# Patient Record
Sex: MALE | Race: BLACK OR AFRICAN AMERICAN | NOT HISPANIC OR LATINO | Employment: UNEMPLOYED | URBAN - NONMETROPOLITAN AREA
[De-identification: names, ages, dates, MRNs, and addresses within clinical notes are randomized per-mention and may not be internally consistent; named-entity substitution may affect disease eponyms.]

---

## 2019-04-16 ENCOUNTER — HOSPITAL ENCOUNTER (EMERGENCY)
Facility: HOSPITAL | Age: 24
Discharge: HOME | End: 2019-04-16
Attending: EMERGENCY MEDICINE | Admitting: EMERGENCY MEDICINE

## 2019-04-16 VITALS
OXYGEN SATURATION: 94 % | WEIGHT: 129 LBS | HEART RATE: 89 BPM | SYSTOLIC BLOOD PRESSURE: 125 MMHG | TEMPERATURE: 98.2 F | HEIGHT: 70 IN | DIASTOLIC BLOOD PRESSURE: 57 MMHG | RESPIRATION RATE: 16 BRPM | BODY MASS INDEX: 18.47 KG/M2

## 2019-04-16 DIAGNOSIS — R51.9 NONINTRACTABLE HEADACHE, UNSPECIFIED CHRONICITY PATTERN, UNSPECIFIED HEADACHE TYPE: Primary | ICD-10-CM

## 2019-04-16 PROCEDURE — 99283 EMERGENCY DEPT VISIT LOW MDM: CPT

## 2019-04-16 PROCEDURE — 93005 ELECTROCARDIOGRAM TRACING: CPT | Performed by: EMERGENCY MEDICINE

## 2019-04-16 PROCEDURE — 93010 ELECTROCARDIOGRAM REPORT: CPT | Performed by: INTERNAL MEDICINE

## 2019-04-16 RX ORDER — ACETAMINOPHEN 500 MG
1000 TABLET ORAL ONCE
Status: COMPLETED | OUTPATIENT
Start: 2019-04-16 | End: 2019-04-16

## 2019-04-16 RX ADMIN — ACETAMINOPHEN 1000 MG: 500 TABLET, FILM COATED ORAL at 21:56

## 2019-05-02 ENCOUNTER — HOSPITAL ENCOUNTER (EMERGENCY)
Facility: HOSPITAL | Age: 24
Discharge: HOME | End: 2019-05-03
Admitting: EMERGENCY MEDICINE

## 2019-05-02 VITALS
DIASTOLIC BLOOD PRESSURE: 71 MMHG | BODY MASS INDEX: 17.81 KG/M2 | SYSTOLIC BLOOD PRESSURE: 139 MMHG | OXYGEN SATURATION: 99 % | HEART RATE: 89 BPM | RESPIRATION RATE: 18 BRPM | WEIGHT: 127.2 LBS | HEIGHT: 71 IN | TEMPERATURE: 97.7 F

## 2019-05-02 DIAGNOSIS — S61.422A LACERATION OF LEFT HAND WITH FOREIGN BODY, INITIAL ENCOUNTER: Primary | ICD-10-CM

## 2019-05-02 PROCEDURE — 99283 EMERGENCY DEPT VISIT LOW MDM: CPT

## 2019-05-03 ENCOUNTER — APPOINTMENT (OUTPATIENT)
Dept: GENERAL RADIOLOGY | Facility: HOSPITAL | Age: 24
End: 2019-05-03

## 2019-05-03 PROCEDURE — 73120 X-RAY EXAM OF HAND: CPT

## 2019-05-03 RX ORDER — LIDOCAINE HYDROCHLORIDE 10 MG/ML
10 INJECTION, SOLUTION EPIDURAL; INFILTRATION; INTRACAUDAL; PERINEURAL ONCE
Status: COMPLETED | OUTPATIENT
Start: 2019-05-03 | End: 2019-05-03

## 2019-05-03 RX ORDER — CEPHALEXIN 500 MG/1
500 CAPSULE ORAL 4 TIMES DAILY
Qty: 40 CAPSULE | Refills: 0 | Status: SHIPPED | OUTPATIENT
Start: 2019-05-03 | End: 2019-05-13

## 2019-05-03 RX ADMIN — LIDOCAINE HYDROCHLORIDE 10 ML: 10 INJECTION, SOLUTION EPIDURAL; INFILTRATION; INTRACAUDAL; PERINEURAL at 01:16

## 2019-05-03 NOTE — ED NOTES
At this time pt refuses Tdap shot. Pt was explained the importance of receiving vaccine. Pt still refuses. Provider aware        Wyatt Goetz RN  05/03/19 0032

## 2019-05-03 NOTE — ED PROVIDER NOTES
Subjective   23-year-old -American male presenting with laceration to the left hand.  He is a right-handed gentleman.  States he was running and tripped over his feet and hit his hand on the asphalt.  He does report having small pimples in the incision.  States this occurred 3 hours prior to arrival.  He is not up-to-date with his tetanus vaccination.  Denies numbness, tingling in the fingers or pain in his wrist.        History provided by:  Patient   used: No    Laceration   Location:  Hand  Hand laceration location:  L palm  Length:  2  Depth:  Through dermis  Quality: straight    Bleeding: controlled    Time since incident:  3 hours  Laceration mechanism:  Fall  Pain details:     Quality:  Aching    Severity:  Moderate    Timing:  Constant    Progression:  Unchanged  Foreign body present: asphalt.  Relieved by:  None tried  Worsened by:  Movement  Ineffective treatments:  None tried  Tetanus status:  Out of date  Associated symptoms: no fever, no focal weakness, no numbness, no rash, no redness, no swelling and no streaking        Review of Systems   Constitutional: Negative for chills, diaphoresis, fatigue and fever.   HENT: Negative for congestion and trouble swallowing.    Respiratory: Negative for shortness of breath and wheezing.    Cardiovascular: Negative for chest pain and palpitations.   Gastrointestinal: Negative for abdominal pain, diarrhea and nausea.   Genitourinary: Negative for dysuria.   Musculoskeletal: Negative for arthralgias and myalgias.   Skin: Positive for wound. Negative for rash.   Neurological: Negative for dizziness, focal weakness and numbness.   Hematological: Negative for adenopathy. Does not bruise/bleed easily.       Past Medical History:   Diagnosis Date   • Asthma        No Known Allergies    No past surgical history on file.    No family history on file.    Social History     Socioeconomic History   • Marital status: Single     Spouse name: Not on  file   • Number of children: Not on file   • Years of education: Not on file   • Highest education level: Not on file   Tobacco Use   • Smoking status: Current Every Day Smoker     Packs/day: 0.50     Types: Cigarettes   Substance and Sexual Activity   • Alcohol use: Yes   • Drug use: Yes     Types: Marijuana       Lab Results (last 24 hours)     ** No results found for the last 24 hours. **          Objective   Physical Exam   Constitutional: He is oriented to person, place, and time. He appears well-developed and well-nourished. No distress.   HENT:   Head: Normocephalic and atraumatic.   Right Ear: External ear normal.   Left Ear: External ear normal.   Eyes: Conjunctivae and EOM are normal. Pupils are equal, round, and reactive to light. Right eye exhibits no discharge. Left eye exhibits no discharge. No scleral icterus.   Neck: Normal range of motion. Neck supple. No tracheal deviation present. No thyromegaly present.   Cardiovascular: Normal rate, regular rhythm, normal heart sounds and intact distal pulses. Exam reveals no friction rub.   No murmur heard.  Pulmonary/Chest: Effort normal and breath sounds normal. No respiratory distress. He has no wheezes.   Abdominal: Soft. Bowel sounds are normal. He exhibits no distension. There is no tenderness. There is no guarding.   Musculoskeletal: Normal range of motion.        Hands:  2 cm laceration to the palmar aspect of left hand.  Small amount of sandra in the wound.  Neurovascularly intact distally.  Less than 2-second capillary refill.  2+ radial pulses.  Wrist and elbow nontender with full range of motion.  All digits nontender with full range of motion.   Neurological: He is alert and oriented to person, place, and time.   Skin: Skin is warm and dry. Capillary refill takes less than 2 seconds. He is not diaphoretic.   Psychiatric: He has a normal mood and affect. His behavior is normal.   Nursing note and vitals reviewed.      Laceration Repair  Date/Time:  "5/3/2019 1:28 AM  Performed by: Christ Puentes PA-C  Authorized by: Christ Puentes PA-C     Consent:     Consent obtained:  Verbal    Consent given by:  Patient    Risks discussed:  Infection, pain, poor cosmetic result and poor wound healing    Alternatives discussed:  No treatment and delayed treatment  Anesthesia (see MAR for exact dosages):     Anesthesia method:  Local infiltration    Local anesthetic:  Lidocaine 1% w/o epi  Laceration details:     Location:  Hand    Hand location:  L palm    Length (cm):  2    Depth (mm):  2  Pre-procedure details:     Preparation:  Patient was prepped and draped in usual sterile fashion and imaging obtained to evaluate for foreign bodies  Exploration:     Wound exploration: wound explored through full range of motion and entire depth of wound probed and visualized      Wound extent: areolar tissue violated, fascia violated and foreign bodies/material      Wound extent: no nerve damage noted, no tendon damage noted and no underlying fracture noted      Contaminated: no    Treatment:     Area cleansed with:  Shur-Clens, Hibiclens and saline    Amount of cleaning:  Extensive    Irrigation solution:  Sterile saline    Irrigation volume:  750    Irrigation method:  Pressure wash and syringe    Visualized foreign bodies/material removed: yes (small debris present)    Skin repair:     Repair method:  Sutures    Suture size:  5-0    Suture material:  Nylon    Suture technique:  Simple interrupted    Number of sutures:  6  Approximation:     Approximation:  Loose  Post-procedure details:     Dressing:  Antibiotic ointment and adhesive bandage    Patient tolerance of procedure:  Tolerated well, no immediate complications             XR Hand 2 View Left    (Results Pending)   XR Hand 2 View Left    (Results Pending)       /71 (BP Location: Right arm, Patient Position: Sitting)   Pulse 89   Temp 97.7 °F (36.5 °C) (Oral)   Resp 18   Ht 180.3 cm (71\")   Wt " 57.7 kg (127 lb 3.2 oz)   SpO2 99%   BMI 17.74 kg/m²     ED Course    ED Course as of May 03 0135   Fri May 03, 2019   0034 Patient is refusing his tetanus vaccination.  [CP]      ED Course User Index  [CP] Christ Puentes PA-C       Medications   Tdap (BOOSTRIX) injection 0.5 mL (0.5 mL Intramuscular Not Given 5/3/19 0029)   lidocaine PF 1% (XYLOCAINE) injection 10 mL (10 mL Infiltration Given by Other 5/3/19 0116)            MDM  Number of Diagnoses or Management Options  Laceration of left hand with foreign body, initial encounter:   Diagnosis management comments: 23-year-old -American male with laceration to left palm.  He was refraining from telling the complete story of the cause of his laceration.  He did state that he was running behind a car and slipped and fell on the asphalt.  States that this occurred 3 hours prior to arrival.  Denies numbness, tingling.  We irrigated this excessively with 750 cc normal saline and had a Betadine soaked.  There were foreign bodies on the initial x-ray imaging.  We did our best to remove these as well.  On repeat imaging still small amount of debris.  I am unable to see this on physical examination.  Patient does understand that are possible as well debris left.  We will have patient follow-up with his primary care physician. Patient will be put on antibiotics for infection prophylaxis.  He declined a tetanus vaccination and was asked multiple times if he changes his mind.  He was adamant on no tetanus.  Pre-and post neurovascularly intact.  No bony deformities noted.  Time of discharge no acute distress normal vital signs.  He voices understanding for strict return precautions strongly encourage follow-up to have sutures removed in 7 days.       Amount and/or Complexity of Data Reviewed  Tests in the radiology section of CPT®: ordered and reviewed  Discuss the patient with other providers: yes    Risk of Complications, Morbidity, and/or  Mortality  Presenting problems: moderate  Diagnostic procedures: moderate  Management options: moderate        Final diagnoses:   Laceration of left hand with foreign body, initial encounter          Christ Puentes PA-C  05/03/19 0137

## 2019-05-03 NOTE — DISCHARGE INSTRUCTIONS
Please take the antibiotics exactly as prescribed.  Your hand is high risk for infection due to being soiled.  Please have your sutures removed in 7 days by the your primary care, urgent care or return to the emergency department.  Should you develop any new, worsening or other concerning symptoms such as those listed below return sooner.    How do I care for my wound closure?  Take medicines only as directed by your health care provider.  If you were prescribed an antibiotic medicine for your wound, finish it all even if you start to feel better.  Use ointments or creams only as directed by your health care provider.  Wash your hands with soap and water before and after touching your wound.  Do not soak your wound in water. Do not take baths, swim, or use a hot tub until your health care provider approves.  Ask your health care provider when you can start showering. Cover your wound if directed by your health care provider.  Do not take out your own sutures or staples.  Do not pick at your wound. Picking can cause an infection.  Keep all follow-up visits as directed by your health care provider. This is important.  How long will I have my wound closure?  Leave adhesive glue on your skin until the glue peels away.  Leave adhesive strips on your skin until the strips fall off.  Absorbable sutures will dissolve within several days.  Nonabsorbable sutures and staples must be removed. The location of the wound will determine how long they stay in. This can range from several days to a couple of weeks.

## 2019-05-03 NOTE — ED NOTES
Provider at bedside at this time. Pt refuses sutures to wound. Provider explained to pt risk and benefits of suturing wound. Pt finally gave consent to suture his would in his left hand       Wyatt Goetz RN  05/03/19 0015

## 2019-05-03 NOTE — ED NOTES
Pt wound was dressed. Pt received education on wound care and sings on infection. Pt refused discharge VS upon discharge         Wyatt Goetz RN  05/03/19 0141

## 2022-08-03 ENCOUNTER — NURSE TRIAGE (OUTPATIENT)
Dept: CALL CENTER | Facility: HOSPITAL | Age: 27
End: 2022-08-03

## 2022-08-03 NOTE — TELEPHONE ENCOUNTER
Caller states COVID symptoms started last night and no way of testing. Caller c/o diarrhea and mild shortness of breath only at this time. Caller advised per guideline and states has no PCP and was advised with c/o shortness of breath be seen in ER.     Reason for Disposition  • MILD difficulty breathing (e.g., minimal/no SOB at rest, SOB with walking, pulse <100)    Additional Information  • Negative: SEVERE difficulty breathing (e.g., struggling for each breath, speaks in single words)  • Negative: Difficult to awaken or acting confused (e.g., disoriented, slurred speech)  • Negative: Bluish (or gray) lips or face now  • Negative: Shock suspected (e.g., cold/pale/clammy skin, too weak to stand, low BP, rapid pulse)  • Negative: Sounds like a life-threatening emergency to the triager  • Negative: [1] Diagnosed or suspected COVID-19 AND [2] symptoms lasting 3 or more weeks  • Negative: [1] COVID-19 exposure AND [2] no symptoms  • Negative: COVID-19 vaccine reaction suspected (e.g., fever, headache, muscle aches) occurring 1 to 3 days after getting vaccine  • Negative: COVID-19 vaccine, questions about  • Negative: [1] Lives with someone known to have influenza (flu test positive) AND [2] flu-like symptoms (e.g., cough, runny nose, sore throat, SOB; with or without fever)  • Negative: [1] Adult with possible COVID-19 symptoms AND [2] triager concerned about severity of symptoms or other causes  • Negative: COVID-19 and breastfeeding, questions about  • Negative: SEVERE or constant chest pain or pressure  (Exception: Mild central chest pain, present only when coughing.)  • Negative: MODERATE difficulty breathing (e.g., speaks in phrases, SOB even at rest, pulse 100-120)  • Negative: [1] Headache AND [2] stiff neck (can't touch chin to chest)  • Negative: Oxygen level (e.g., pulse oximetry) 90 percent or lower  • Negative: Chest pain or pressure  • Negative: Patient sounds very sick or weak to the triager    Answer  "Assessment - Initial Assessment Questions  1. COVID-19 DIAGNOSIS: \"Who made your COVID-19 diagnosis? Was it confirmed by a positive lab test?\"       Denies   2. COVID-19 EXPOSURE: \"Was there any known exposure to COVID-19 before the symptoms began?\" Household exposure or close contact with positive COVID-19 patient outside the home (, school, work, play or sports).  [facility] Definition of close contact: within 6 feet (2 meters) for a total of 15 minutes or more over a 24-hour period.       Not sure   3. ONSET: \"When did the COVID-19 symptoms start?\"       Last night   4. WORST SYMPTOM: \"What is your child's worst symptom?\"        Diarrhea   5. COUGH: \"Does your child have a cough?\" If so, ask, \"How bad is the cough?\"        Some but not bad   6. RESPIRATORY DISTRESS: \"Describe your child's breathing. What does it sound like?\" (e.g., wheezing, stridor, grunting, weak cry, unable to speak, retractions, rapid rate, cyanosis)      Shortness of breath and states mild   7. BETTER-SAME-WORSE: \"Is your child getting better, staying the same or getting worse compared to yesterday?\"  If getting worse, ask, \"In what way?\"      Same   8. FEVER: \"Does your child have a fever?\" If so, ask: \"What is it, how was it measured, and how long has it been present?\"       Denies   9. OTHER SYMPTOMS: \"Does your child have any other symptoms?\" (e.g., chills or shaking, sore throat, muscle pains, headache, loss of smell)       Denies chills and shaking   10. CHILD'S APPEARANCE: \"How sick is your child acting?\" \" What is he doing right now?\" If asleep, ask: \"How was he acting before he went to sleep?\"          Alert   11. HIGHER RISK for COMPLICATIONS with FLU or COVID-19 : \"Does your child have any chronic medical problems?\" (e.g., heart or lung disease, diabetes, asthma, cancer, weak immune system, etc. See that List in Background Information.  Reason: may need antiviral if has positive test for influenza.)         Denies   12. " "VACCINES:  \"Is your child vaccinated against COVID-19?\" If so,\"What vaccine (Pfizer, Moderna, Brandt and Brandt) did they receive?\" \"Have they received a booster shot?\"  Fully Vaccinated definition (CDC):   Person has completed primary vaccine series and received a booster shot OR has completed primary vaccine series within the last 5 months and not yet eligible for booster shot.   Other people are either unvaccinated or partially vaccinated.         Denies       Note to Triager - Respiratory Distress: Always rule out respiratory distress (also known as working hard to breathe or shortness of breath). Listen for grunting, stridor, wheezing, tachypnea in these calls. How to assess: Listen to the child's breathing early in your assessment. Reason: What you hear is often more valid than the caller's answers to your triage questions.    Answer Assessment - Initial Assessment Questions  1. COVID-19 DIAGNOSIS: \"Who made your COVID-19 diagnosis?\" \"Was it confirmed by a positive lab test or self-test?\" If not diagnosed by a doctor (or NP/PA), ask \"Are there lots of cases (community spread) where you live?\" Note: See public health department website, if unsure.      Denies   2. COVID-19 EXPOSURE: \"Was there any known exposure to COVID before the symptoms began?\" CDC Definition of close contact: within 6 feet (2 meters) for a total of 15 minutes or more over a 24-hour period.       Unknown   3. ONSET: \"When did the COVID-19 symptoms start?\"       Last night   4. WORST SYMPTOM: \"What is your worst symptom?\" (e.g., cough, fever, shortness of breath, muscle aches)      Diarrhea   5. COUGH: \"Do you have a cough?\" If Yes, ask: \"How bad is the cough?\"        Mild   6. FEVER: \"Do you have a fever?\" If Yes, ask: \"What is your temperature, how was it measured, and when did it start?\"      Denies   7. RESPIRATORY STATUS: \"Describe your breathing?\" (e.g., shortness of breath, wheezing, unable to speak)       Mild shortness of breath " "  8. BETTER-SAME-WORSE: \"Are you getting better, staying the same or getting worse compared to yesterday?\"  If getting worse, ask, \"In what way?\"      Same   9. HIGH RISK DISEASE: \"Do you have any chronic medical problems?\" (e.g., asthma, heart or lung disease, weak immune system, obesity, etc.)      Denies   10. VACCINE: \"Have you had the COVID-19 vaccine?\" If Yes, ask: \"Which one, how many shots, when did you get it?\"        Denies   11. BOOSTER: \"Have you received your COVID-19 booster?\" If Yes, ask: \"Which one and when did you get it?\"        Denies   12. PREGNANCY: \"Is there any chance you are pregnant?\" \"When was your last menstrual period?\"          13. OTHER SYMPTOMS: \"Do you have any other symptoms?\"  (e.g., chills, fatigue, headache, loss of smell or taste, muscle pain, sore throat)        Denies   14. O2 SATURATION MONITOR:  \"Do you use an oxygen saturation monitor (pulse oximeter) at home?\" If Yes, ask \"What is your reading (oxygen level) today?\" \"What is your usual oxygen saturation reading?\" (e.g., 95%)        Denies    Protocols used: CORONAVIRUS (COVID-19) DIAGNOSED OR SUSPECTED-ADULT-AH, CORONAVIRUS (COVID-19) DIAGNOSED OR SUSPECTED-PEDIATRIC-AH      "

## 2024-11-02 ENCOUNTER — HOSPITAL ENCOUNTER (EMERGENCY)
Facility: HOSPITAL | Age: 29
Discharge: HOME | End: 2024-11-02
Attending: EMERGENCY MEDICINE | Admitting: EMERGENCY MEDICINE
Payer: COMMERCIAL

## 2024-11-02 VITALS
BODY MASS INDEX: 19.33 KG/M2 | TEMPERATURE: 97.6 F | DIASTOLIC BLOOD PRESSURE: 84 MMHG | OXYGEN SATURATION: 99 % | SYSTOLIC BLOOD PRESSURE: 150 MMHG | HEIGHT: 70 IN | RESPIRATION RATE: 16 BRPM | HEART RATE: 85 BPM | WEIGHT: 135 LBS

## 2024-11-02 DIAGNOSIS — K04.7 DENTAL INFECTION: Primary | ICD-10-CM

## 2024-11-02 DIAGNOSIS — K08.89 ODONTALGIA: ICD-10-CM

## 2024-11-02 PROCEDURE — 99283 EMERGENCY DEPT VISIT LOW MDM: CPT

## 2024-11-02 RX ORDER — OXYCODONE AND ACETAMINOPHEN 5; 325 MG/1; MG/1
1 TABLET ORAL ONCE
Status: COMPLETED | OUTPATIENT
Start: 2024-11-02 | End: 2024-11-02

## 2024-11-02 RX ORDER — NAPROXEN 250 MG/1
250 TABLET ORAL ONCE
Status: COMPLETED | OUTPATIENT
Start: 2024-11-02 | End: 2024-11-02

## 2024-11-02 RX ORDER — OXYCODONE AND ACETAMINOPHEN 5; 325 MG/1; MG/1
1 TABLET ORAL EVERY 6 HOURS PRN
Qty: 5 TABLET | Refills: 0 | Status: SHIPPED | OUTPATIENT
Start: 2024-11-02

## 2024-11-02 RX ORDER — NAPROXEN 500 MG/1
500 TABLET ORAL 2 TIMES DAILY PRN
Qty: 20 TABLET | Refills: 0 | Status: SHIPPED | OUTPATIENT
Start: 2024-11-02

## 2024-11-02 RX ORDER — CLINDAMYCIN HYDROCHLORIDE 300 MG/1
300 CAPSULE ORAL 4 TIMES DAILY
Qty: 40 CAPSULE | Refills: 0 | Status: SHIPPED | OUTPATIENT
Start: 2024-11-02

## 2024-11-02 RX ADMIN — OXYCODONE HYDROCHLORIDE AND ACETAMINOPHEN 1 TABLET: 5; 325 TABLET ORAL at 08:44

## 2024-11-02 RX ADMIN — NAPROXEN 250 MG: 250 TABLET ORAL at 08:45

## 2024-11-02 NOTE — ED PROVIDER NOTES
Subjective   History of Present Illness  28-year-old male presents to the ED with complaint of tooth pain.  He has history of dental caries.  Worsening toothache pain for the past several days.  Pain is worse with eating/chewing.  Mild pain with opening his mouth but no trismus.  No submandibular swelling.  No fever, no odynophagia.  Denies history of diabetes.  Pain is moderate to severe, no aggravating no alleviating factors.    History provided by:  Patient      Review of Systems   All other systems reviewed and are negative.      Past Medical History:   Diagnosis Date    Asthma        No Known Allergies    No past surgical history on file.    No family history on file.    Social History     Socioeconomic History    Marital status: Single   Tobacco Use    Smoking status: Every Day     Current packs/day: 0.50     Types: Cigarettes    Smokeless tobacco: Never   Vaping Use    Vaping status: Never Used   Substance and Sexual Activity    Alcohol use: Not Currently    Drug use: Yes     Types: Marijuana           Objective   Physical Exam  Vitals and nursing note reviewed.   Constitutional:       Appearance: Normal appearance. He is normal weight.   HENT:      Nose: Nose normal. No congestion or rhinorrhea.      Mouth/Throat:      Mouth: Mucous membranes are moist.      Comments: Left lower molar dental caries.  Appears to have erupting wisdom teeth as well.  Patient has gingival erythema and edema but no abscess/purulence.  No tooth abscess/buccal abscess present.  Mildly tender left submandibular lymphadenopathy.  No brawny submandibular edema, no trismus.    No peritonsillar swelling, uvula midline, normal voice  Cardiovascular:      Rate and Rhythm: Normal rate and regular rhythm.      Heart sounds: Normal heart sounds. No murmur heard.  Pulmonary:      Effort: Pulmonary effort is normal.      Breath sounds: Normal breath sounds. No wheezing, rhonchi or rales.   Skin:     General: Skin is warm and dry.       Capillary Refill: Capillary refill takes less than 2 seconds.   Neurological:      Mental Status: He is alert.         Procedures         ED Course  ED Course as of 11/02/24 0822   Sat Nov 02, 2024   0819 28-year-old male with toothache pain from dental infection.  Has some tender left submandibular lymphadenopathy.  No evidence of Alberto's angina.  No peritonsillar abscess present.  Voice is normal.  He is well-appearing, afebrile, vital signs stable.  Will treat with pain medication, anti-inflammatory medication, antibiotics and have patient follow-up with dental clinic as an outpatient. [AW]      ED Course User Index  [AW] Raul Brown MD                                               Medical Decision Making      Final diagnoses:   Dental infection   Odontalgia       ED Disposition  ED Disposition       ED Disposition   Discharge    Condition   Stable    Comment   --                DENTISTRY [address]  [place] [address] [phone]  Schedule an appointment as soon as possible for a visit            Medication List        New Prescriptions      clindamycin 300 MG capsule  Commonly known as: CLEOCIN  Take 1 capsule by mouth 4 (Four) Times a Day.     naproxen 500 MG tablet  Commonly known as: NAPROSYN  Take 1 tablet by mouth 2 (Two) Times a Day As Needed for Mild Pain or Moderate Pain.     oxyCODONE-acetaminophen 5-325 MG per tablet  Commonly known as: PERCOCET  Take 1 tablet by mouth Every 6 (Six) Hours As Needed for Severe Pain.               Where to Get Your Medications        These medications were sent to [facility]/pharmacy #[id number] - [place] - [address] - [phone]  - [phone] fx [address] [facility] [address]      Phone: [phone]   clindamycin 300 MG capsule  naproxen 500 MG tablet  oxyCODONE-acetaminophen 5-325 MG per tablet            Raul Brown MD  11/02/24 08